# Patient Record
Sex: FEMALE | Race: WHITE | NOT HISPANIC OR LATINO
[De-identification: names, ages, dates, MRNs, and addresses within clinical notes are randomized per-mention and may not be internally consistent; named-entity substitution may affect disease eponyms.]

---

## 2020-11-13 ENCOUNTER — APPOINTMENT (OUTPATIENT)
Dept: PULMONOLOGY | Facility: CLINIC | Age: 50
End: 2020-11-13
Payer: COMMERCIAL

## 2020-11-13 VITALS
HEIGHT: 62 IN | BODY MASS INDEX: 21.53 KG/M2 | TEMPERATURE: 97.2 F | HEART RATE: 71 BPM | DIASTOLIC BLOOD PRESSURE: 77 MMHG | OXYGEN SATURATION: 98 % | SYSTOLIC BLOOD PRESSURE: 120 MMHG | WEIGHT: 117 LBS

## 2020-11-13 DIAGNOSIS — Z78.9 OTHER SPECIFIED HEALTH STATUS: ICD-10-CM

## 2020-11-13 DIAGNOSIS — J18.9 PNEUMONIA, UNSPECIFIED ORGANISM: ICD-10-CM

## 2020-11-13 PROCEDURE — 99204 OFFICE O/P NEW MOD 45 MIN: CPT | Mod: 25

## 2020-11-13 PROCEDURE — 99072 ADDL SUPL MATRL&STAF TM PHE: CPT

## 2020-11-13 PROCEDURE — 36415 COLL VENOUS BLD VENIPUNCTURE: CPT

## 2020-11-13 PROCEDURE — 71046 X-RAY EXAM CHEST 2 VIEWS: CPT

## 2020-11-14 PROBLEM — Z78.9 CURRENT NON-SMOKER: Status: ACTIVE | Noted: 2020-11-14

## 2020-11-14 PROBLEM — Z78.9 NO PERTINENT PAST MEDICAL HISTORY: Status: RESOLVED | Noted: 2020-11-14 | Resolved: 2020-11-14

## 2020-11-14 NOTE — DISCUSSION/SUMMARY
[FreeTextEntry1] : she may be feeling a bit better.\par \par the ct scan showed no clot, d dimer slight elev \par \par normal wbc,  this is very odd.  but the sagittal finding speaks against this being cancer, as does the rapidity of onset.  \par \par plan follow up in two weeks.

## 2020-11-14 NOTE — REVIEW OF SYSTEMS
[Cough] : no cough [Sputum] : no sputum [Dyspnea] : no dyspnea [Pleuritic Pain] : pleuritic pain [Negative] : Hematologic

## 2020-11-14 NOTE — PHYSICAL EXAM
[No Acute Distress] : no acute distress [Normal Oropharynx] : normal oropharynx [Normal Appearance] : normal appearance [No Neck Mass] : no neck mass [Normal Rate/Rhythm] : normal rate/rhythm [Normal S1, S2] : normal s1, s2 [No Murmurs] : no murmurs [No Resp Distress] : no resp distress [Clear to Auscultation Bilaterally] : clear to auscultation bilaterally [No Clubbing] : no clubbing [No Edema] : no edema [Normal Color/ Pigmentation] : normal color/ pigmentation [No Focal Deficits] : no focal deficits [No Motor Deficits] : no motor deficits [Oriented x3] : oriented x3 [Normal Affect] : normal affect

## 2020-11-14 NOTE — HISTORY OF PRESENT ILLNESS
[TextBox_4] : a prodrome, of a few weeks of sob, slight cough, no fever no sweats  a few days ago, pain in the back that radiatied to front, have bigeminy, had gone tocardiology  full baron was fine.\par no leg swelling .\par \par pain got worse.  in the er ceftin and z pack, may feel a bit better.

## 2020-11-14 NOTE — PROCEDURE
[FreeTextEntry1] : chest film, mass like opacity\par \par but chest ct landon on sagittal has multiple felicitas of pleural based infiltrate appart from the main opacity\par \par no wbc, and c reactiver protein today normal\par

## 2020-11-24 ENCOUNTER — APPOINTMENT (OUTPATIENT)
Dept: PULMONOLOGY | Facility: CLINIC | Age: 50
End: 2020-11-24
Payer: COMMERCIAL

## 2020-11-24 VITALS
TEMPERATURE: 97.1 F | OXYGEN SATURATION: 100 % | WEIGHT: 112 LBS | HEART RATE: 70 BPM | DIASTOLIC BLOOD PRESSURE: 72 MMHG | HEIGHT: 62 IN | SYSTOLIC BLOOD PRESSURE: 120 MMHG | BODY MASS INDEX: 20.61 KG/M2

## 2020-11-24 PROCEDURE — 71046 X-RAY EXAM CHEST 2 VIEWS: CPT

## 2020-11-24 PROCEDURE — 99214 OFFICE O/P EST MOD 30 MIN: CPT | Mod: 25

## 2020-11-24 RX ORDER — AMOXICILLIN AND CLAVULANATE POTASSIUM 875; 125 MG/1; MG/1
875-125 TABLET, COATED ORAL
Qty: 20 | Refills: 5 | Status: ACTIVE | COMMUNITY
Start: 2020-11-24 | End: 1900-01-01

## 2020-11-25 NOTE — HISTORY OF PRESENT ILLNESS
[TextBox_4] : 50 year old woman non smoker, sudden onset of pain, but had resp illness prodrome and fd to have mass like opacity, but on ct, landon lateral it looks inflammatory because several features.\par \par pain is better, but never had fever, cough or sputum\par \par I allowed her to finish the ceph that she was given\par \par she feels as if she has more energy and is breathing better

## 2020-11-25 NOTE — DISCUSSION/SUMMARY
[FreeTextEntry1] : feels better, pain almost gone and slight look that the infiltrate is improving.\par \par oddly c reactive protein was normal ( cardiac sent for some reason)\par \par will see again in two week on augmentin,  if no change will repeat the ct scan to see if there is any change that would support and infectious etiology,  if no change a biopsy is needed.\par \par could this be an organizing pneumonia.

## 2020-11-25 NOTE — PHYSICAL EXAM
[No Acute Distress] : no acute distress [Normal Oropharynx] : normal oropharynx [Normal Appearance] : normal appearance [No Neck Mass] : no neck mass [Normal Rate/Rhythm] : normal rate/rhythm [Normal S1, S2] : normal s1, s2 [No Murmurs] : no murmurs [Normal Color/ Pigmentation] : normal color/ pigmentation [No Focal Deficits] : no focal deficits [Oriented x3] : oriented x3 [TextBox_68] : slight rales on the left. rhonchi

## 2020-11-25 NOTE — PROCEDURE
[FreeTextEntry1] : chest film  looks somewhat better where it is superimposed on the breast shadow, so it' hard to read.\par \par

## 2020-11-25 NOTE — REVIEW OF SYSTEMS
[Negative] : Endocrine [TextBox_3] : less fatigue [TextBox_30] : better ashley it was ,  no sob, and less pain

## 2020-12-08 ENCOUNTER — APPOINTMENT (OUTPATIENT)
Dept: PULMONOLOGY | Facility: CLINIC | Age: 50
End: 2020-12-08
Payer: COMMERCIAL

## 2020-12-08 VITALS
TEMPERATURE: 98 F | WEIGHT: 112 LBS | SYSTOLIC BLOOD PRESSURE: 110 MMHG | BODY MASS INDEX: 20.61 KG/M2 | HEART RATE: 79 BPM | HEIGHT: 62 IN | OXYGEN SATURATION: 99 % | DIASTOLIC BLOOD PRESSURE: 72 MMHG

## 2020-12-08 PROCEDURE — 99072 ADDL SUPL MATRL&STAF TM PHE: CPT

## 2020-12-08 PROCEDURE — 71046 X-RAY EXAM CHEST 2 VIEWS: CPT

## 2020-12-08 PROCEDURE — 99214 OFFICE O/P EST MOD 30 MIN: CPT | Mod: 25

## 2020-12-08 PROCEDURE — 36415 COLL VENOUS BLD VENIPUNCTURE: CPT

## 2020-12-09 NOTE — HISTORY OF PRESENT ILLNESS
[TextBox_4] : she continues to be symtomatic\par \par the radiologic picture is that of a consolidating pneumonia, but the clnical picture and the behavior over th past month with antibiotics has been  not characteristic of an inflammatory process unless it's  or fungal or tubercular\par \par needs a biopsy\par \par the repeat ct shows no change at all in the past month

## 2020-12-09 NOTE — PROCEDURE
[FreeTextEntry1] : no radiologic or ct scan\par \par multi focal areas of nodularity and consolidation\par \par will need a biopsy will arrange\par \par i'm very concerned.

## 2020-12-15 ENCOUNTER — APPOINTMENT (OUTPATIENT)
Dept: CT IMAGING | Facility: HOSPITAL | Age: 50
End: 2020-12-15
Payer: COMMERCIAL

## 2020-12-15 ENCOUNTER — APPOINTMENT (OUTPATIENT)
Dept: INTERVENTIONAL RADIOLOGY/VASCULAR | Facility: HOSPITAL | Age: 50
End: 2020-12-15
Payer: COMMERCIAL

## 2020-12-15 ENCOUNTER — RESULT REVIEW (OUTPATIENT)
Age: 50
End: 2020-12-15

## 2020-12-15 ENCOUNTER — OUTPATIENT (OUTPATIENT)
Dept: OUTPATIENT SERVICES | Facility: HOSPITAL | Age: 50
LOS: 1 days | End: 2020-12-15
Payer: COMMERCIAL

## 2020-12-15 PROCEDURE — 88341 IMHCHEM/IMCYTCHM EA ADD ANTB: CPT | Mod: 26

## 2020-12-15 PROCEDURE — 88173 CYTOPATH EVAL FNA REPORT: CPT | Mod: 26

## 2020-12-15 PROCEDURE — 88305 TISSUE EXAM BY PATHOLOGIST: CPT

## 2020-12-15 PROCEDURE — 32405: CPT

## 2020-12-15 PROCEDURE — 88173 CYTOPATH EVAL FNA REPORT: CPT

## 2020-12-15 PROCEDURE — 71045 X-RAY EXAM CHEST 1 VIEW: CPT | Mod: 26,76

## 2020-12-15 PROCEDURE — 77012 CT SCAN FOR NEEDLE BIOPSY: CPT | Mod: 26

## 2020-12-15 PROCEDURE — 88342 IMHCHEM/IMCYTCHM 1ST ANTB: CPT | Mod: 26

## 2020-12-15 PROCEDURE — 88305 TISSUE EXAM BY PATHOLOGIST: CPT | Mod: 26

## 2020-12-15 PROCEDURE — 88341 IMHCHEM/IMCYTCHM EA ADD ANTB: CPT

## 2020-12-15 PROCEDURE — 71045 X-RAY EXAM CHEST 1 VIEW: CPT | Mod: 26

## 2020-12-15 PROCEDURE — 71045 X-RAY EXAM CHEST 1 VIEW: CPT

## 2020-12-15 PROCEDURE — 77012 CT SCAN FOR NEEDLE BIOPSY: CPT

## 2020-12-16 LAB
NON-GYNECOLOGICAL CYTOLOGY STUDY: SIGNIFICANT CHANGE UP
SURGICAL PATHOLOGY STUDY: SIGNIFICANT CHANGE UP

## 2020-12-20 RX ORDER — ALPRAZOLAM 0.25 MG/1
0.25 TABLET ORAL
Qty: 120 | Refills: 0 | Status: ACTIVE | COMMUNITY
Start: 2020-12-20 | End: 1900-01-01

## 2020-12-24 ENCOUNTER — APPOINTMENT (OUTPATIENT)
Dept: THORACIC SURGERY | Facility: CLINIC | Age: 50
End: 2020-12-24
Payer: COMMERCIAL

## 2020-12-24 DIAGNOSIS — C34.92 MALIGNANT NEOPLASM OF UNSPECIFIED PART OF LEFT BRONCHUS OR LUNG: ICD-10-CM

## 2020-12-24 DIAGNOSIS — R22.2 LOCALIZED SWELLING, MASS AND LUMP, TRUNK: ICD-10-CM

## 2020-12-24 PROCEDURE — 99442: CPT

## 2020-12-28 ENCOUNTER — LABORATORY RESULT (OUTPATIENT)
Age: 50
End: 2020-12-28

## 2020-12-30 ENCOUNTER — OUTPATIENT (OUTPATIENT)
Dept: OUTPATIENT SERVICES | Facility: HOSPITAL | Age: 50
LOS: 1 days | End: 2020-12-30
Payer: COMMERCIAL

## 2020-12-30 ENCOUNTER — APPOINTMENT (OUTPATIENT)
Dept: CT IMAGING | Facility: HOSPITAL | Age: 50
End: 2020-12-30

## 2020-12-30 ENCOUNTER — APPOINTMENT (OUTPATIENT)
Dept: PULMONOLOGY | Facility: CLINIC | Age: 50
End: 2020-12-30
Payer: COMMERCIAL

## 2020-12-30 ENCOUNTER — LABORATORY RESULT (OUTPATIENT)
Age: 50
End: 2020-12-30

## 2020-12-30 PROCEDURE — 70460 CT HEAD/BRAIN W/DYE: CPT | Mod: 26

## 2020-12-30 PROCEDURE — 94060 EVALUATION OF WHEEZING: CPT

## 2020-12-30 PROCEDURE — 99072 ADDL SUPL MATRL&STAF TM PHE: CPT

## 2020-12-30 PROCEDURE — 36415 COLL VENOUS BLD VENIPUNCTURE: CPT

## 2020-12-30 PROCEDURE — 94729 DIFFUSING CAPACITY: CPT

## 2020-12-30 PROCEDURE — 93000 ELECTROCARDIOGRAM COMPLETE: CPT

## 2020-12-30 PROCEDURE — 70460 CT HEAD/BRAIN W/DYE: CPT

## 2020-12-30 PROCEDURE — 94727 GAS DIL/WSHOT DETER LNG VOL: CPT

## 2020-12-30 PROCEDURE — ZZZZZ: CPT

## 2020-12-30 PROCEDURE — 99212 OFFICE O/P EST SF 10 MIN: CPT | Mod: 25

## 2020-12-30 NOTE — HISTORY OF PRESENT ILLNESS
[TextBox_4] : patient with lung cancer dx via biopsy\par \par request for moleculars put in\par \par lobectomy planned\par \par lymph node dissection\par \par discussed with patient\par \par preop testing done

## 2020-12-30 NOTE — REVIEW OF SYSTEMS
[Pleuritic Pain] : pleuritic pain [Negative] : Endocrine [TextBox_3] : less fatigue [TextBox_30] : better ashley it was ,  no sob, and less pain

## 2020-12-30 NOTE — DISCUSSION/SUMMARY
[FreeTextEntry1] : cleared for surgery\par \par discuss at length with dr. schulte and dr. Guzman.\par \par will likely need post op chemo due to size, visceral pleura\par \par lymph nodes status to be determined.\par \par since there are no clearly enlarged mediastinal nodes or pet positive nodes,  operative exploration of nodes rather than pre op ebus is correct.

## 2020-12-31 ENCOUNTER — NON-APPOINTMENT (OUTPATIENT)
Age: 50
End: 2020-12-31

## 2020-12-31 VITALS
DIASTOLIC BLOOD PRESSURE: 69 MMHG | TEMPERATURE: 98 F | WEIGHT: 107.81 LBS | HEART RATE: 95 BPM | OXYGEN SATURATION: 97 % | SYSTOLIC BLOOD PRESSURE: 115 MMHG | HEIGHT: 62 IN | RESPIRATION RATE: 16 BRPM

## 2020-12-31 NOTE — ASSESSMENT
[FreeTextEntry1] : 49 yo female\par \par \par -results of the ct scan and , if confined to the lower lobe then would proceed with resection \par -Bronchoscopy, Mediastinoscopy, possible LEFT VATS robotic assisted left lower lobectomy with lymph node dissection. if med negative then proceed with lobectomy \par -if lymph nodes posiive then chemo/immuno (induction) then surgery \par -final path with give us a stage and determine plan of care \par -risks discussed \par -elevated ptt, possible factor 5 leiden (Dr. Guzman- get the workup results), everything was ok, might have a weak inhibitor that was not detectable and cleared her for the biopsy \par -\par \par Plan: \par Ct head with and without\par hematology\par pet- get images and report [facility] \par pft\par \par then bornch: mediastinoscopy, possible left vats left lower lobectomy with lymph node dissection

## 2020-12-31 NOTE — ADDENDUM
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  The patient will require extensive disease workup including CT of the head with contrast, PET scan, and will be presented to the thoracic tumor board. If disease is confined to the lung, high-level, then the patient will be considered a candidate for bronchoscopy, mediastinoscopy with lymph node biopsy, intraoperative frozen section, and possible left video assisted thoracic surgery, left lower lobectomy with mediastinal and hilar lymph node dissection.\par \par The risks, benefits and alternatives are proceeding with mediastinoscopy were discussed.  This included the risk of bleeding, conversion to an open procedure, recurrent nerve injury, vascular injury, need for additional biopsy/procedure, infection, pain, DVT, pulmonary embolus, dysrhythmia, myocardial infarction as well as mortality.  The patient understands and agrees.\par \par If mediastinoscopy reveals stage III A disease, the patient will require induction therapy and reassessment.  If mediastinoscopy reveals stage III B disease, the patient will require definitive therapy, non-surgical.  If mediastinoscopy is negative for pathologic mediastinal lymph nodes, we will proceed with lung resection. \par \par The risks, benefits and alternatives were explained to the patient, who understood and agreed to the above. This includes the risk of bleeding, need for a blood transfusion, conversion to an open procedure, postoperative pain syndrome, wound infection, possible adjuvant therapy need, need for surveillance, DVT, pulmonary embolus, dysrhythmia, myocardial infarction, as well as mortality. The patient understands the need for postoperative chest drainage as well.\par \par

## 2020-12-31 NOTE — HISTORY OF PRESENT ILLNESS
[FreeTextEntry1] : 50 year old female, never smoker?, with a PMHX of ***, with reports of SOB, with recent CT chest revealing CAP treated with antibiotics. Subsequent LEFT lower lobe FNA revealed adenocarcinoma. She is referred by Dr. Feliciano for an initial evaluation. \par \par CT chest completed on 11/10/20:\par - prominent peripheral consolidation in the left lower lobe with left hilar adenopathy\par - 1.7 cm liver lesion incompletely evaluated, MRI liber with and without contrast recommended\par \par LUNG, LEFT LOWER LOBE, FNA on 12/15/20:\par POSITIVE FOR MALIGNANT CELLS\par Well differentiated adenocarcinoma.\par -Lung, left lower lobe, core biopsy:\par  Adenocarcinoma with mucinous and acinar features (see note). Note: Immunostains will be reported in an addendum.\par Immunostains show the tumor to be positive for TTF1 and NapsinA,\par confirming lung primary.\par \par \par \par \par

## 2021-01-02 ENCOUNTER — LABORATORY RESULT (OUTPATIENT)
Age: 51
End: 2021-01-02

## 2021-01-03 ENCOUNTER — TRANSCRIPTION ENCOUNTER (OUTPATIENT)
Age: 51
End: 2021-01-03

## 2021-01-04 ENCOUNTER — RESULT REVIEW (OUTPATIENT)
Age: 51
End: 2021-01-04

## 2021-01-04 ENCOUNTER — INPATIENT (INPATIENT)
Facility: HOSPITAL | Age: 51
LOS: 0 days | Discharge: HOME | DRG: 167 | End: 2021-01-04
Attending: SPECIALIST | Admitting: SPECIALIST
Payer: COMMERCIAL

## 2021-01-04 ENCOUNTER — APPOINTMENT (OUTPATIENT)
Dept: THORACIC SURGERY | Facility: HOSPITAL | Age: 51
End: 2021-01-04

## 2021-01-04 VITALS
RESPIRATION RATE: 20 BRPM | DIASTOLIC BLOOD PRESSURE: 68 MMHG | HEART RATE: 82 BPM | OXYGEN SATURATION: 98 % | SYSTOLIC BLOOD PRESSURE: 111 MMHG

## 2021-01-04 DIAGNOSIS — Z98.890 OTHER SPECIFIED POSTPROCEDURAL STATES: Chronic | ICD-10-CM

## 2021-01-04 DIAGNOSIS — Z98.891 HISTORY OF UTERINE SCAR FROM PREVIOUS SURGERY: Chronic | ICD-10-CM

## 2021-01-04 LAB
BLD GP AB SCN SERPL QL: NEGATIVE — SIGNIFICANT CHANGE UP
RH IG SCN BLD-IMP: NEGATIVE — SIGNIFICANT CHANGE UP

## 2021-01-04 PROCEDURE — 71045 X-RAY EXAM CHEST 1 VIEW: CPT | Mod: 26

## 2021-01-04 PROCEDURE — S2900: CPT

## 2021-01-04 PROCEDURE — 86901 BLOOD TYPING SEROLOGIC RH(D): CPT

## 2021-01-04 PROCEDURE — 39402 MEDIASTINOSCPY W/LMPH NOD BX: CPT

## 2021-01-04 PROCEDURE — 88305 TISSUE EXAM BY PATHOLOGIST: CPT

## 2021-01-04 PROCEDURE — 86850 RBC ANTIBODY SCREEN: CPT

## 2021-01-04 PROCEDURE — 86900 BLOOD TYPING SEROLOGIC ABO: CPT

## 2021-01-04 PROCEDURE — 71045 X-RAY EXAM CHEST 1 VIEW: CPT

## 2021-01-04 PROCEDURE — 88341 IMHCHEM/IMCYTCHM EA ADD ANTB: CPT

## 2021-01-04 PROCEDURE — 88342 IMHCHEM/IMCYTCHM 1ST ANTB: CPT | Mod: 26

## 2021-01-04 PROCEDURE — 88331 PATH CONSLTJ SURG 1 BLK 1SPC: CPT | Mod: 26

## 2021-01-04 PROCEDURE — 88341 IMHCHEM/IMCYTCHM EA ADD ANTB: CPT | Mod: 26

## 2021-01-04 PROCEDURE — ZZZZZ: CPT

## 2021-01-04 PROCEDURE — 88331 PATH CONSLTJ SURG 1 BLK 1SPC: CPT

## 2021-01-04 PROCEDURE — 31622 DX BRONCHOSCOPE/WASH: CPT

## 2021-01-04 PROCEDURE — 88305 TISSUE EXAM BY PATHOLOGIST: CPT | Mod: 26

## 2021-01-04 RX ORDER — BUPIVACAINE 13.3 MG/ML
20 INJECTION, SUSPENSION, LIPOSOMAL INFILTRATION ONCE
Refills: 0 | Status: DISCONTINUED | OUTPATIENT
Start: 2021-01-04 | End: 2021-01-04

## 2021-01-04 RX ORDER — ACETAMINOPHEN 500 MG
750 TABLET ORAL ONCE
Refills: 0 | Status: COMPLETED | OUTPATIENT
Start: 2021-01-04 | End: 2021-01-04

## 2021-01-04 RX ORDER — METOCLOPRAMIDE HCL 10 MG
10 TABLET ORAL ONCE
Refills: 0 | Status: COMPLETED | OUTPATIENT
Start: 2021-01-04 | End: 2021-01-04

## 2021-01-04 RX ADMIN — Medication 10 MILLIGRAM(S): at 09:40

## 2021-01-04 RX ADMIN — Medication 300 MILLIGRAM(S): at 10:13

## 2021-01-04 RX ADMIN — Medication 750 MILLIGRAM(S): at 10:40

## 2021-01-04 NOTE — PACU DISCHARGE NOTE - COMMENTS
Discharge instructions given to pt., pt. verbalized understanding, v/s stable, pt. to be D/c'd home accomapnied by .

## 2021-01-04 NOTE — H&P ADULT - NSHPPHYSICALEXAM_GEN_ALL_CORE
Appearance: No acute distress.  Neurologic: AAOx3, no AMS or focal deficits.  Responds appropriately to verbal and physical stimuli; exhibits purposeful movement in all extremities.  HEENT:   MMM, PERRLA, EOMI	b/l  Neck: Supple, + JVD/ - JVD; +/- Carotid Bruit   Cardiovascular: RRR, S1 S2. No m/r/g.  Respiratory: No acute respiratory distress. CTA b/l, no w/r/r.   Gastrointestinal:  Soft, non-tender, non-distended, + BS.	  Skin: No rashes. No ecchymoses. No cyanosis.  Extremities: Exhibits normal range of motion, no clubbing, cyanosis or edema.  Vascular: Peripheral pulses palpable 2+ bilaterally. Appearance: No acute distress.  Neurologic: AAOx3, no AMS or focal deficits.  Responds appropriately to verbal and physical stimuli; exhibits purposeful movement in all extremities.  HEENT:   MMM, PERRLA, EOMI	b/l  Neck: Supple, + - JVD; - Carotid Bruit   Cardiovascular: RRR, S1 S2. No m/r/g.  Respiratory: No acute respiratory distress. CTA b/l, no w/r/r.   Gastrointestinal:  Soft, non-tender, non-distended, + BS.	  Skin: No rashes. No ecchymoses. No cyanosis.  Extremities: Exhibits normal range of motion, no clubbing, cyanosis or edema.  Vascular: Peripheral pulses palpable 2+ bilaterally.

## 2021-01-04 NOTE — H&P ADULT - NSICDXPASTSURGICALHX_GEN_ALL_CORE_FT
PAST SURGICAL HISTORY:  H/O elbow surgery left fracture    H/O umbilical hernia repair     History of 2  sections

## 2021-01-04 NOTE — H&P ADULT - HISTORY OF PRESENT ILLNESS
49 y/o female, never smoker and with no PMHx initially presented with SOB and CT chest revealed CAP which was treated with antibiotics with no improvement. Patient was then referred for a left lower lobe FNA which revealed well differentiated adenocarcinoma. Patient underwent a PET CT which showed increased metabolic activity within the left lower lobe. CT head was done showing no evidence of metastatic disease. Patient underwent medical clearance and now presents for her planned Bronchoscopy, mediastinoscopy, left VATS, robotic assisted left lower lobectomy and mediastinal lymph node dissection. Patient was seen in [facility] in her usual state of health without any acute complaints.

## 2021-01-04 NOTE — H&P ADULT - NSHPREVIEWOFSYSTEMS_GEN_ALL_CORE
Review of Systems  CONSTITUTIONAL:  Denies Fevers / chills, sweats, fatigue, weight loss, weight gain                                      NEURO:  Denies parathesias, seizures, syncope, confusion                                                                                EYES:  Denies Blurry vision, discharge, pain, loss of vision                                                                                    ENMT:  Denies Difficulty hearing, vertigo, dysphagia, epistaxis, recent dental work                                       CV:  Denies Chest pain, palpitations, LION, orthopnea                                                                                          RESPIRATORY:  Denies Wheezing, SOB, cough / sputum, hemoptysis                                                                GI:  Denies Nausea, vommiting, diarrhea, constipation, melena, difficulty swallowing                                               : Denies Hematuria, dysuria, urgency, incontinence                                                                                         MUSKULOSKELETAL:  Denies arthritis, joint swelling, muscle weakness                                                             SKIN/BREAST:  Denies rash, itching, marlin loss, masses                                                                                            PSYCH:  Denies depresion, anxiety, suicidal ideation                                                                                               HEME/LYMPH:  Denies bruises easily, enlarged lymph nodes, tender lymph nodes                                        ENDOCRINE:  Denies cold intolerance, heat intolerance, polydipsia Review of Systems  CONSTITUTIONAL:  Denies Fevers / chills, sweats, fatigue, weight loss, weight gain                                      NEURO:  Denies parathesias, seizures, syncope, confusion                                                                                EYES:  Denies Blurry vision, discharge, pain, loss of vision                                                                                    ENMT:  Denies Difficulty hearing, vertigo, dysphagia, epistaxis, recent dental work                                       CV:  Denies Chest pain, palpitations, LION, orthopnea                                                                                          RESPIRATORY: +SOB, Denies Wheezing,  cough / sputum, hemoptysis                                                                GI:  Denies Nausea, vommiting, diarrhea, constipation, melena, difficulty swallowing                                               : Denies Hematuria, dysuria, urgency, incontinence                                                                                         MUSKULOSKELETAL:  Denies arthritis, joint swelling, muscle weakness                                                             SKIN/BREAST:  Denies rash, itching, marlin loss, masses                                                                                            PSYCH:  Denies depresion, anxiety, suicidal ideation                                                                                               HEME/LYMPH:  Denies bruises easily, enlarged lymph nodes, tender lymph nodes                                        ENDOCRINE:  Denies cold intolerance, heat intolerance, polydipsia

## 2021-01-04 NOTE — H&P ADULT - ASSESSMENT
51 y/o female, never smoker and with no PMHx initially presented with SOB and CT chest revealed CAP which was treated with antibiotics with no improvement. Patient was then referred for a left lower lobe FNA which revealed well differentiated adenocarcinoma. Patient underwent a PET CT which showed increased metabolic activity within the left lower lobe. CT head was done showing no evidence of metastatic disease. Patient underwent medical clearance and now presents for her planned Bronchoscopy, mediastinoscopy, left VATS, robotic assisted left lower lobectomy and mediastinal lymph node dissection.    - chart reviewed, consent obtained  - T&S performed, 2 PRBC on hold for OR  - plan for PACU/9LA post op

## 2021-01-06 DIAGNOSIS — Z88.1 ALLERGY STATUS TO OTHER ANTIBIOTIC AGENTS STATUS: ICD-10-CM

## 2021-01-06 DIAGNOSIS — C77.1 SECONDARY AND UNSPECIFIED MALIGNANT NEOPLASM OF INTRATHORACIC LYMPH NODES: ICD-10-CM

## 2021-01-06 DIAGNOSIS — C34.32 MALIGNANT NEOPLASM OF LOWER LOBE, LEFT BRONCHUS OR LUNG: ICD-10-CM

## 2021-01-08 LAB — SURGICAL PATHOLOGY STUDY: SIGNIFICANT CHANGE UP

## 2021-01-12 RX ORDER — ZOLPIDEM TARTRATE 10 MG/1
10 TABLET ORAL
Qty: 30 | Refills: 0 | Status: ACTIVE | COMMUNITY
Start: 2021-01-12 | End: 1900-01-01

## 2021-01-22 LAB
ALBUMIN SERPL ELPH-MCNC: 4.7 G/DL
ALBUMIN SERPL ELPH-MCNC: 4.9 G/DL
ALP BLD-CCNC: 77 U/L
ALP BLD-CCNC: 86 U/L
ALT SERPL-CCNC: 17 U/L
ALT SERPL-CCNC: 19 U/L
ANION GAP SERPL CALC-SCNC: 13 MMOL/L
ANION GAP SERPL CALC-SCNC: 21 MMOL/L
APPEARANCE: CLEAR
APTT BLD: 39.3 SEC
APTT BLD: 46 SEC
AST SERPL-CCNC: 19 U/L
AST SERPL-CCNC: 26 U/L
BASOPHILS # BLD AUTO: 0.04 K/UL
BASOPHILS # BLD AUTO: 0.06 K/UL
BASOPHILS NFR BLD AUTO: 0.7 %
BASOPHILS NFR BLD AUTO: 1.3 %
BILIRUB SERPL-MCNC: 0.3 MG/DL
BILIRUB SERPL-MCNC: 0.3 MG/DL
BILIRUBIN URINE: NEGATIVE
BLOOD URINE: NEGATIVE
BUN SERPL-MCNC: 22 MG/DL
BUN SERPL-MCNC: 22 MG/DL
CALCIUM SERPL-MCNC: 9.8 MG/DL
CALCIUM SERPL-MCNC: 9.9 MG/DL
CEA SERPL-MCNC: 2.9 NG/ML
CHLORIDE SERPL-SCNC: 102 MMOL/L
CHLORIDE SERPL-SCNC: 98 MMOL/L
CO2 SERPL-SCNC: 22 MMOL/L
CO2 SERPL-SCNC: 23 MMOL/L
COLOR: COLORLESS
CREAT SERPL-MCNC: 0.82 MG/DL
CREAT SERPL-MCNC: 0.9 MG/DL
CRP SERPL HS-MCNC: 0.29 MG/L
EOSINOPHIL # BLD AUTO: 0.09 K/UL
EOSINOPHIL # BLD AUTO: 0.1 K/UL
EOSINOPHIL NFR BLD AUTO: 1.5 %
EOSINOPHIL NFR BLD AUTO: 2.2 %
GLUCOSE QUALITATIVE U: NEGATIVE
GLUCOSE SERPL-MCNC: 36 MG/DL
GLUCOSE SERPL-MCNC: 86 MG/DL
HCT VFR BLD CALC: 44.7 %
HCT VFR BLD CALC: 44.9 %
HGB BLD-MCNC: 14.1 G/DL
HGB BLD-MCNC: 14.2 G/DL
IMM GRANULOCYTES NFR BLD AUTO: 0 %
IMM GRANULOCYTES NFR BLD AUTO: 0.3 %
INR PPP: 1.12 RATIO
INR PPP: 1.13 RATIO
KETONES URINE: NEGATIVE
LEUKOCYTE ESTERASE URINE: ABNORMAL
LYMPHOCYTES # BLD AUTO: 1 K/UL
LYMPHOCYTES # BLD AUTO: 1.07 K/UL
LYMPHOCYTES NFR BLD AUTO: 16.3 %
LYMPHOCYTES NFR BLD AUTO: 23.8 %
MAN DIFF?: NORMAL
MAN DIFF?: NORMAL
MCHC RBC-ENTMCNC: 31.4 PG
MCHC RBC-ENTMCNC: 31.5 GM/DL
MCHC RBC-ENTMCNC: 31.5 PG
MCHC RBC-ENTMCNC: 31.6 GM/DL
MCV RBC AUTO: 99.3 FL
MCV RBC AUTO: 99.8 FL
MONOCYTES # BLD AUTO: 0.38 K/UL
MONOCYTES # BLD AUTO: 0.64 K/UL
MONOCYTES NFR BLD AUTO: 10.4 %
MONOCYTES NFR BLD AUTO: 8.4 %
NEUTROPHILS # BLD AUTO: 2.89 K/UL
NEUTROPHILS # BLD AUTO: 4.35 K/UL
NEUTROPHILS NFR BLD AUTO: 64.3 %
NEUTROPHILS NFR BLD AUTO: 70.8 %
NITRITE URINE: NEGATIVE
PH URINE: 6
PLATELET # BLD AUTO: 200 K/UL
PLATELET # BLD AUTO: 253 K/UL
POTASSIUM SERPL-SCNC: 4.4 MMOL/L
POTASSIUM SERPL-SCNC: 5 MMOL/L
PROT SERPL-MCNC: 7.4 G/DL
PROT SERPL-MCNC: 7.7 G/DL
PROTEIN URINE: NEGATIVE
PT BLD: 13.2 SEC
PT BLD: 13.3 SEC
RBC # BLD: 4.48 M/UL
RBC # BLD: 4.52 M/UL
RBC # FLD: 12.7 %
RBC # FLD: 12.7 %
SODIUM SERPL-SCNC: 138 MMOL/L
SODIUM SERPL-SCNC: 141 MMOL/L
SPECIFIC GRAVITY URINE: 1
UROBILINOGEN URINE: NORMAL
WBC # FLD AUTO: 4.5 K/UL
WBC # FLD AUTO: 6.14 K/UL
